# Patient Record
Sex: MALE | Race: WHITE | NOT HISPANIC OR LATINO | Employment: FULL TIME | URBAN - METROPOLITAN AREA
[De-identification: names, ages, dates, MRNs, and addresses within clinical notes are randomized per-mention and may not be internally consistent; named-entity substitution may affect disease eponyms.]

---
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
## 2021-09-14 ENCOUNTER — TELEPHONE (OUTPATIENT)
Dept: SCHEDULING | Facility: IMAGING CENTER | Age: 21
End: 2021-09-14

## 2021-09-15 ENCOUNTER — OFFICE VISIT (OUTPATIENT)
Dept: MEDICAL GROUP | Facility: PHYSICIAN GROUP | Age: 21
End: 2021-09-15
Payer: COMMERCIAL

## 2021-09-15 VITALS
TEMPERATURE: 99 F | BODY MASS INDEX: 23.23 KG/M2 | SYSTOLIC BLOOD PRESSURE: 116 MMHG | DIASTOLIC BLOOD PRESSURE: 50 MMHG | HEIGHT: 67 IN | HEART RATE: 80 BPM | WEIGHT: 148 LBS | OXYGEN SATURATION: 96 % | RESPIRATION RATE: 12 BRPM

## 2021-09-15 DIAGNOSIS — Z76.89 ENCOUNTER TO ESTABLISH CARE: ICD-10-CM

## 2021-09-15 PROCEDURE — 99385 PREV VISIT NEW AGE 18-39: CPT | Performed by: STUDENT IN AN ORGANIZED HEALTH CARE EDUCATION/TRAINING PROGRAM

## 2021-09-15 ASSESSMENT — PATIENT HEALTH QUESTIONNAIRE - PHQ9: CLINICAL INTERPRETATION OF PHQ2 SCORE: 0

## 2021-09-15 ASSESSMENT — FIBROSIS 4 INDEX: FIB4 SCORE: .3333333333333333333

## 2021-09-15 NOTE — PROGRESS NOTES
"Subjective:     CC:  The encounter diagnosis was Encounter to establish care.    HISTORY OF THE PRESENT ILLNESS: Patient is a 21 y.o. male. This pleasant patient is here today to establish care and receive a letter of clearance for potential  service. He not had primary care in many years.    Active Diagnosis:    Patient Active Problem List   Diagnosis   • Contusion of left cerebral hemisphere (HCC)   • Trauma   • Ear hematoma, left   • Left-sided chest wall pain   • Encounter to establish care          Current Outpatient Medications Ordered in Epic   Medication Sig Dispense Refill   • MAGNESIUM PO Take 1 Tablet by mouth every day.       No current [facility]-ordered facility-administered medications on file.     ROS:   Gen: No fevers/chills, no changes in weight  HEENT: No changes in vision/hearing, sore throat.  Pulm: No cough, unexplained SOB.  CV: No chest pain/pressure, no palpitations  GI: No nausea/vomiting, no diarrhea  : No dysuria/nocturia  MSk: No myalgias  Skin: No rash/skin changes  Neuro: No headaches, no numbness/tingling  Heme/Lymph: no easy bruising      Objective:     Exam: /50 (BP Location: Left arm, Patient Position: Sitting, BP Cuff Size: Adult)   Pulse 80   Temp 37.2 °C (99 °F) (Temporal)   Resp 12   Ht 1.697 m (5' 6.8\")   Wt 67.1 kg (148 lb)   SpO2 96%  Body mass index is 23.32 kg/m².    General: Normal appearing. No distress.  HEENT: Normocephalic. Eyes conjunctiva clear lids without ptosis, pupils equal and reactive to light accommodation. Ears normal shape and contour, canals are clear bilaterally, tympanic membranes are benign, nasal mucosa benign, oropharynx is without erythema, edema or exudates.   Neck: Supple without JVD or bruit. Thyroid is not enlarged.  Pulmonary: Clear to ausculation.  Normal effort. No rales, ronchi, or wheezing.  Cardiovascular: Regular rate and rhythm without murmur. Radial pulses are intact and equal bilaterally.  Abdomen: Soft, nontender, " nondistended. Normal bowel sounds. Liver and spleen are not palpable  Neurologic: Grossly nonfocal.  CN II through XII intact.  Memory intact.  Negative dysdiadochokinesia and cerebellar function defects.  Normal balance.  Negative pronator drift.  Lymph: No cervical or supraclavicular lymph nodes are palpable  Skin: Warm and dry.  No obvious lesions.  Musculoskeletal: Normal gait. No extremity cyanosis, clubbing, or edema.  Psych: Normal mood and affect. Alert and oriented x3. Judgment and insight are normal.    A chaperone was offered to the patient during today's exam. Patient declined chaperone.    Labs:   No labs available.    Assessment & Plan:   21 y.o. male with the following -    1.  Healthcare maintenance  -We will obtain an TIA for vaccine records and use this to guide vaccine administration during MA visits.  -We have discussed diet/exercise/weight maintenance.  -We have discussed continued abstinence from tobacco and excessive alcohol.  -Physical or psychological defect was identified during an extensive evaluation that should preclude this young gentleman from  service and a letter has been supplied stating such.    Return in about 1 year (around 9/15/2022).    Please note that this dictation was created using voice recognition software. I have made every reasonable attempt to correct obvious errors, but I expect that there are errors of grammar and possibly content that I did not discover before finalizing the note.    Mario Kumar PA-C 9/15/2021

## 2021-09-15 NOTE — LETTER
September 15, 2021    To whom it may concern,    I have performed a physical and psychological evaluation of Mr. Johnnie Anthony,  2000, in my office on 9/15/2021.     I have found no defect, neurological or otherwise, that should preclude  service.    Please let me know if there are any specific tests or questions that are necessary for  service.        Mario Kumar PA-C    NPI [id number]                                Mario Kumar P.A.-C.

## 2022-08-17 NOTE — THERAPY
"Occupational Therapy Evaluation completed.   Functional Status:  Spv w/bed mobility, spv w/sit>stand walking in room no AD no LOB, spv w/grooming standing. Discussed TBI recovery and risk for re-injury, pt has a hx of TBI/Concussion and chronic h/a.   Plan of Care: Will benefit from Occupational Therapy 2 times per week- will follow up as needed for questions   Discharge Recommendations:  Equipment: No Equipment Needed. Post-acute therapy Anticipate that the patient will have no further occupational therapy needs after discharge from the hospital.       See \"Rehab Therapy-Acute\" Patient Summary Report for complete documentation.      19 yr old male admitted after snowboarding accident where he struck his head was not wearing a helmet. Pt is dx w/a closed head injury and SAH. At this time pt reports no changes in vision, has no signs of weakness or impaired balance or cognitive deficits. Initiation of TBI/Concussion recovery was initiated anticipate on going progression in thi setting will be available to follow up for 1-2 more tx in this setting as needed. Anticipate no post acute OT needs  " I need a new prescription for Saxenda to start PA. Please advise thank you. If this requires a response please respond to the pool. [address]).

## 2023-08-09 ENCOUNTER — TELEPHONE (OUTPATIENT)
Dept: HEALTH INFORMATION MANAGEMENT | Facility: OTHER | Age: 23
End: 2023-08-09